# Patient Record
Sex: FEMALE | Race: WHITE | Employment: FULL TIME | URBAN - METROPOLITAN AREA
[De-identification: names, ages, dates, MRNs, and addresses within clinical notes are randomized per-mention and may not be internally consistent; named-entity substitution may affect disease eponyms.]

---

## 2018-03-20 ENCOUNTER — OFFICE VISIT (OUTPATIENT)
Dept: INTERNAL MEDICINE CLINIC | Facility: CLINIC | Age: 33
End: 2018-03-20

## 2018-03-20 VITALS
RESPIRATION RATE: 18 BRPM | TEMPERATURE: 97.9 F | WEIGHT: 193 LBS | SYSTOLIC BLOOD PRESSURE: 142 MMHG | BODY MASS INDEX: 29.25 KG/M2 | HEART RATE: 69 BPM | HEIGHT: 68 IN | DIASTOLIC BLOOD PRESSURE: 88 MMHG

## 2018-03-20 DIAGNOSIS — E66.3 OVERWEIGHT (BMI 25.0-29.9): ICD-10-CM

## 2018-03-20 DIAGNOSIS — N96 HISTORY OF RECURRENT MISCARRIAGES: Primary | ICD-10-CM

## 2018-03-20 DIAGNOSIS — Z86.39 HISTORY OF HYPERCHOLESTEROLEMIA: ICD-10-CM

## 2018-03-20 NOTE — MR AVS SNAPSHOT
[address] [place] [address] 
[phone] Patient: Baldev Pichardo MRN: [id number] :1985 Visit Information Date & Time Provider Department Dept. Phone Encounter #  
 3/20/2018 10:45 AM Naya Reynolds PA-C [facility] Internal Medicine [phone] [id number] Follow-up Instructions Return in about 1 year (around 3/20/2019) for Physical when convenient. Upcoming Health Maintenance Date Due DTaP/Tdap/Td series (1 - Tdap) 10/12/2004 PAP AKA CERVICAL CYTOLOGY 10/12/2004 Influenza Age 5 to Adult 2017 Allergies as of 3/20/2018  Review Complete On: 3/20/2018 By: Naya Reynolds PA-C Severity Noted Reaction Type Reactions Penicillins  2018    Hives Current Immunizations  Never Reviewed No immunizations on file. Not reviewed this visit You Were Diagnosed With   
  
 Codes Comments History of recurrent miscarriages    -  Primary ICD-10-CM: N96 
ICD-9-CM: [id number] History of hypercholesterolemia     ICD-10-CM: Z86.39 
ICD-9-CM: V12.29 Vitals BP Pulse Temp Resp Height(growth percentile) Weight(growth percentile) 131/90 69 97.9 °F (36.6 °C) (Oral) 18 5' 8\" (1.727 m) 193 lb (87.5 kg) LMP BMI OB Status Smoking Status 2018 (Exact Date) 29.35 kg/m2 Having regular periods Never Smoker Vitals History BMI and BSA Data Body Mass Index Body Surface Area  
 29.35 kg/m 2 2.05 m 2 Preferred Pharmacy Pharmacy Name Phone [id number]- [place] [facility], [address] [phone] Your Updated Medication List  
  
Notice  As of 3/20/2018 11:22 AM  
 You have not been prescribed any medications. We Performed the Following CBC WITH AUTOMATED DIFF [82529 CPT(R)] HEMOGLOBIN A1C W/O EAG [44225 CPT(R)] LIPID PANEL [44829 CPT(R)] METABOLIC PANEL, COMPREHENSIVE [02032 CPT(R)] TSH RFX ON ABNORMAL TO FREE T4 [[id number] Custom] Follow-up Instructions Return in about 1 year (around 3/20/2019) for Physical when convenient. Introducing [facility]! Benjie Man introduces [email or web address] patient portal. Now you can access parts of your medical record, email your doctor's office, and request medication refills online. 1. In your internet browser, go to [email or web address] 2. Click on the First Time User? Click Here link in the Sign In box. You will see the New Member Sign Up page. 3. Enter your Explorer.io Access Code exactly as it appears below. You will not need to use this code after youve completed the sign-up process. If you do not sign up before the expiration date, you must request a new code. · Explorer.io Access Code: [email or web address] Expires: 6/18/2018 10:27 AM 
 
4. Enter the last four digits of your Social Security Number (xxxx) and Date of Birth (mm/dd/yyyy) as indicated and click Submit. You will be taken to the next sign-up page. 5. Create a Explorer.io ID. This will be your Explorer.io login ID and cannot be changed, so think of one that is secure and easy to remember. 6. Create a Explorer.io password. You can change your password at any time. 7. Enter your Password Reset Question and Answer. This can be used at a later time if you forget your password. 8. Enter your e-mail address. You will receive e-mail notification when new information is available in [address] 9. Click Sign Up. You can now view and download portions of your medical record. 10. Click the Download Summary menu link to download a portable copy of your medical information. If you have questions, please visit the Frequently Asked Questions section of the Explorer.io website. Remember, Explorer.io is NOT to be used for urgent needs. For medical emergencies, dial 911. Now available from your iPhone and Android! Please provide this summary of care documentation to your next provider. Your primary care clinician is listed as Lalo Recio. If you have any questions after today's visit, please call [phone].

## 2018-03-20 NOTE — PROGRESS NOTES
Chief Complaint   Patient presents with   [facility].

## 2018-03-20 NOTE — PROGRESS NOTES
HISTORY OF PRESENT ILLNESS  Vaibhav Kwan is a 27 y.o. female. Chief Complaint   Patient presents with   [facility]  1) Elevated BP - new dx. Pt notes she has never had high blood pressure readings, but she was at a work party with salty food last night. Uncontrolled at first check:   BP Readings from Last 1 Encounters:   03/20/18 131/90     Remains uncontrolled at recheck:  BP Readings from Last 3 Encounters:   03/20/18 142/88       2) Seeing Dr. Che Beach - ttc   Has a 2.4 yo son. Conceives easily, but has had recurrent miscarriages. Has had 2 miscarriages in the past few months. Emotionally feeling supported and handling this well. Declines counseling/medication at this time. 3) Overweight and hx hyperchol - doing weight watchers to lose weight - lean protein and vegetables. Exercising 10,000 steps/day. Cardio/weights 3 days/week. Review of Systems   Constitutional: Negative for weight loss. Respiratory: Negative for shortness of breath. Cardiovascular: Negative for chest pain and palpitations. Neurological: Negative for dizziness, loss of consciousness and weakness. Psychiatric/Behavioral: Negative for depression and substance abuse. The patient is not nervous/anxious and does not have insomnia. Physical Exam   Constitutional: She is oriented to person, place, and time. She appears well-developed and well-nourished. No distress. HENT:   Head: Normocephalic and atraumatic. Neck: Neck supple. No JVD present. Cardiovascular: Normal rate, regular rhythm and normal heart sounds. Pulmonary/Chest: Effort normal and breath sounds normal. No respiratory distress. Musculoskeletal: She exhibits no edema. Neurological: She is alert and oriented to person, place, and time. Skin: Skin is warm and dry. Psychiatric: She has a normal mood and affect.  Her behavior is normal. Judgment and thought content normal.   Nursing note and vitals reviewed. ASSESSMENT and PLAN    ICD-10-CM ICD-9-CM    1. History of recurrent miscarriages N96 [id number] CBC WITH AUTOMATED DIFF      TSH RFX ON ABNORMAL TO FREE T4   2. History of hypercholesterolemia G17.54 U77.33 METABOLIC PANEL, COMPREHENSIVE      HEMOGLOBIN A1C W/O EAG      LIPID PANEL   3. Overweight (BMI 25.0-29. 9) E66.3 278.02 Encouraged pt's excellent work on this.

## 2018-03-21 PROBLEM — E78.00 HYPERCHOLESTEREMIA: Status: ACTIVE | Noted: 2018-03-21

## 2018-03-21 PROBLEM — E66.3 OVERWEIGHT (BMI 25.0-29.9): Status: ACTIVE | Noted: 2018-03-21

## 2018-03-21 LAB
ALBUMIN SERPL-MCNC: 4.7 G/DL (ref 3.5–5.5)
ALBUMIN/GLOB SERPL: 1.9 {RATIO} (ref 1.2–2.2)
ALP SERPL-CCNC: 36 IU/L (ref 39–117)
ALT SERPL-CCNC: 24 IU/L (ref 0–32)
AST SERPL-CCNC: 18 IU/L (ref 0–40)
BASOPHILS # BLD AUTO: 0 X10E3/UL (ref 0–0.2)
BASOPHILS NFR BLD AUTO: 0 %
BILIRUB SERPL-MCNC: 0.3 MG/DL (ref 0–1.2)
BUN SERPL-MCNC: 13 MG/DL (ref 6–20)
BUN/CREAT SERPL: 15 (ref 9–23)
CALCIUM SERPL-MCNC: 9.4 MG/DL (ref 8.7–10.2)
CHLORIDE SERPL-SCNC: 98 MMOL/L (ref 96–106)
CHOLEST SERPL-MCNC: 240 MG/DL (ref 100–199)
CO2 SERPL-SCNC: 26 MMOL/L (ref 18–29)
CREAT SERPL-MCNC: 0.88 MG/DL (ref 0.57–1)
EOSINOPHIL # BLD AUTO: 0.1 X10E3/UL (ref 0–0.4)
EOSINOPHIL NFR BLD AUTO: 1 %
ERYTHROCYTE [DISTWIDTH] IN BLOOD BY AUTOMATED COUNT: 13.4 % (ref 12.3–15.4)
GFR SERPLBLD CREATININE-BSD FMLA CKD-EPI: 86 ML/MIN/1.73
GFR SERPLBLD CREATININE-BSD FMLA CKD-EPI: 99 ML/MIN/1.73
GLOBULIN SER CALC-MCNC: 2.5 G/DL (ref 1.5–4.5)
GLUCOSE SERPL-MCNC: 93 MG/DL (ref 65–99)
HBA1C MFR BLD: 5.2 % (ref 4.8–5.6)
HCT VFR BLD AUTO: 43.2 % (ref 34–46.6)
HDLC SERPL-MCNC: 82 MG/DL
HGB BLD-MCNC: 14 G/DL (ref 11.1–15.9)
IMM GRANULOCYTES # BLD: 0 X10E3/UL (ref 0–0.1)
IMM GRANULOCYTES NFR BLD: 0 %
LDLC SERPL CALC-MCNC: 122 MG/DL (ref 0–99)
LYMPHOCYTES # BLD AUTO: 2.2 X10E3/UL (ref 0.7–3.1)
LYMPHOCYTES NFR BLD AUTO: 29 %
MCH RBC QN AUTO: 28.5 PG (ref 26.6–33)
MCHC RBC AUTO-ENTMCNC: 32.4 G/DL (ref 31.5–35.7)
MCV RBC AUTO: 88 FL (ref 79–97)
MONOCYTES # BLD AUTO: 0.4 X10E3/UL (ref 0.1–0.9)
MONOCYTES NFR BLD AUTO: 5 %
NEUTROPHILS # BLD AUTO: 4.9 X10E3/UL (ref 1.4–7)
NEUTROPHILS NFR BLD AUTO: 65 %
PLATELET # BLD AUTO: 240 X10E3/UL (ref 150–379)
POTASSIUM SERPL-SCNC: 4.1 MMOL/L (ref 3.5–5.2)
PROT SERPL-MCNC: 7.2 G/DL (ref 6–8.5)
RBC # BLD AUTO: 4.91 X10E6/UL (ref 3.77–5.28)
SODIUM SERPL-SCNC: 139 MMOL/L (ref 134–144)
TRIGL SERPL-MCNC: 180 MG/DL (ref 0–149)
TSH SERPL DL<=0.005 MIU/L-ACNC: 1.46 UIU/ML (ref 0.45–4.5)
VLDLC SERPL CALC-MCNC: 36 MG/DL (ref 5–40)
WBC # BLD AUTO: 7.6 X10E3/UL (ref 3.4–10.8)

## 2018-12-19 LAB
ANTIBODY SCREEN, EXTERNAL: NEGATIVE
CHLAMYDIA, EXTERNAL: NEGATIVE
HBSAG, EXTERNAL: NEGATIVE
HIV, EXTERNAL: NON REACTIVE
N. GONORRHEA, EXTERNAL: NEGATIVE
RUBELLA, EXTERNAL: NORMAL
T. PALLIDUM, EXTERNAL: NEGATIVE
TYPE, ABO & RH, EXTERNAL: NORMAL

## 2019-01-29 ENCOUNTER — HOSPITAL ENCOUNTER (OUTPATIENT)
Dept: ULTRASOUND IMAGING | Age: 34
Discharge: HOME | End: 2019-01-29
Attending: OBSTETRICS & GYNECOLOGY
Payer: COMMERCIAL

## 2019-01-29 DIAGNOSIS — Q51.3 BICORNUATE UTERUS: ICD-10-CM

## 2019-01-29 PROCEDURE — 76770 US EXAM ABDO BACK WALL COMP: CPT

## 2019-06-11 LAB — GRBS, EXTERNAL: NEGATIVE

## 2019-06-29 ENCOUNTER — ANESTHESIA (OUTPATIENT)
Dept: LABOR AND DELIVERY | Age: 34
End: 2019-06-29
Payer: COMMERCIAL

## 2019-06-29 ENCOUNTER — ANESTHESIA EVENT (OUTPATIENT)
Dept: LABOR AND DELIVERY | Age: 34
End: 2019-06-29
Payer: COMMERCIAL

## 2019-06-29 ENCOUNTER — HOSPITAL ENCOUNTER (INPATIENT)
Age: 34
LOS: 2 days | Discharge: HOME | End: 2019-07-01
Attending: OBSTETRICS & GYNECOLOGY | Admitting: ADVANCED PRACTICE MIDWIFE
Payer: COMMERCIAL

## 2019-06-29 PROBLEM — Z34.90 PREGNANCY: Status: ACTIVE | Noted: 2019-06-29

## 2019-06-29 LAB
BASOPHILS # BLD: 0 K/UL (ref 0–0.1)
BASOPHILS NFR BLD: 0 % (ref 0–1)
DIFFERENTIAL METHOD BLD: ABNORMAL
EOSINOPHIL # BLD: 0.1 K/UL (ref 0–0.4)
EOSINOPHIL NFR BLD: 1 % (ref 0–7)
ERYTHROCYTE [DISTWIDTH] IN BLOOD BY AUTOMATED COUNT: 14.2 % (ref 11.5–14.5)
HCT VFR BLD AUTO: 38.4 % (ref 35–47)
HGB BLD-MCNC: 12.3 G/DL (ref 11.5–16)
IMM GRANULOCYTES # BLD AUTO: 0.1 K/UL (ref 0–0.04)
IMM GRANULOCYTES NFR BLD AUTO: 1 % (ref 0–0.5)
LYMPHOCYTES # BLD: 2 K/UL (ref 0.8–3.5)
LYMPHOCYTES NFR BLD: 21 % (ref 12–49)
MCH RBC QN AUTO: 27.5 PG (ref 26–34)
MCHC RBC AUTO-ENTMCNC: 32 G/DL (ref 30–36.5)
MCV RBC AUTO: 85.7 FL (ref 80–99)
MONOCYTES # BLD: 0.5 K/UL (ref 0–1)
MONOCYTES NFR BLD: 5 % (ref 5–13)
NEUTS SEG # BLD: 6.9 K/UL (ref 1.8–8)
NEUTS SEG NFR BLD: 72 % (ref 32–75)
NRBC # BLD: 0 K/UL (ref 0–0.01)
NRBC BLD-RTO: 0 PER 100 WBC
PLATELET # BLD AUTO: 168 K/UL (ref 150–400)
PMV BLD AUTO: 11.4 FL (ref 8.9–12.9)
RBC # BLD AUTO: 4.48 M/UL (ref 3.8–5.2)
WBC # BLD AUTO: 9.5 K/UL (ref 3.6–11)

## 2019-06-29 PROCEDURE — 75410000000 HC DELIVERY VAGINAL/SINGLE

## 2019-06-29 PROCEDURE — 99284 EMERGENCY DEPT VISIT MOD MDM: CPT

## 2019-06-29 PROCEDURE — 74011250637 HC RX REV CODE- 250/637: Performed by: ADVANCED PRACTICE MIDWIFE

## 2019-06-29 PROCEDURE — 3E0R3BZ INTRODUCTION OF ANESTHETIC AGENT INTO SPINAL CANAL, PERCUTANEOUS APPROACH: ICD-10-PCS | Performed by: ADVANCED PRACTICE MIDWIFE

## 2019-06-29 PROCEDURE — 76060000078 HC EPIDURAL ANESTHESIA

## 2019-06-29 PROCEDURE — 0KQM0ZZ REPAIR PERINEUM MUSCLE, OPEN APPROACH: ICD-10-PCS | Performed by: ADVANCED PRACTICE MIDWIFE

## 2019-06-29 PROCEDURE — 74011250636 HC RX REV CODE- 250/636: Performed by: ADVANCED PRACTICE MIDWIFE

## 2019-06-29 PROCEDURE — 75410000002 HC LABOR FEE PER 1 HR

## 2019-06-29 PROCEDURE — 74011000250 HC RX REV CODE- 250: Performed by: ANESTHESIOLOGY

## 2019-06-29 PROCEDURE — 75410000003 HC RECOV DEL/VAG/CSECN EA 0.5 HR

## 2019-06-29 PROCEDURE — A4300 CATH IMPL VASC ACCESS PORTAL: HCPCS

## 2019-06-29 PROCEDURE — 77030011943

## 2019-06-29 PROCEDURE — 85025 COMPLETE CBC W/AUTO DIFF WBC: CPT

## 2019-06-29 PROCEDURE — 36415 COLL VENOUS BLD VENIPUNCTURE: CPT

## 2019-06-29 PROCEDURE — 65410000002 HC RM PRIVATE OB

## 2019-06-29 PROCEDURE — 74011250636 HC RX REV CODE- 250/636: Performed by: ANESTHESIOLOGY

## 2019-06-29 RX ORDER — SODIUM CHLORIDE 0.9 % (FLUSH) 0.9 %
5-40 SYRINGE (ML) INJECTION EVERY 8 HOURS
Status: DISCONTINUED | OUTPATIENT
Start: 2019-06-30 | End: 2019-07-01 | Stop reason: HOSPADM

## 2019-06-29 RX ORDER — ACETAMINOPHEN 325 MG/1
650 TABLET ORAL
Status: DISCONTINUED | OUTPATIENT
Start: 2019-06-29 | End: 2019-07-01 | Stop reason: HOSPADM

## 2019-06-29 RX ORDER — SODIUM CHLORIDE 0.9 % (FLUSH) 0.9 %
5-40 SYRINGE (ML) INJECTION EVERY 8 HOURS
Status: DISCONTINUED | OUTPATIENT
Start: 2019-06-29 | End: 2019-06-29

## 2019-06-29 RX ORDER — FENTANYL/BUPIVACAINE/NS/PF 2-1250MCG
1-16 PREFILLED PUMP RESERVOIR EPIDURAL CONTINUOUS
Status: DISCONTINUED | OUTPATIENT
Start: 2019-06-29 | End: 2019-06-29

## 2019-06-29 RX ORDER — FENTANYL CITRATE 50 UG/ML
INJECTION, SOLUTION INTRAMUSCULAR; INTRAVENOUS AS NEEDED
Status: DISCONTINUED | OUTPATIENT
Start: 2019-06-29 | End: 2019-06-29 | Stop reason: HOSPADM

## 2019-06-29 RX ORDER — SODIUM CHLORIDE 0.9 % (FLUSH) 0.9 %
5-40 SYRINGE (ML) INJECTION AS NEEDED
Status: DISCONTINUED | OUTPATIENT
Start: 2019-06-29 | End: 2019-07-01 | Stop reason: HOSPADM

## 2019-06-29 RX ORDER — IBUPROFEN 400 MG/1
800 TABLET ORAL EVERY 8 HOURS
Status: DISCONTINUED | OUTPATIENT
Start: 2019-06-30 | End: 2019-07-01 | Stop reason: HOSPADM

## 2019-06-29 RX ORDER — EPHEDRINE SULFATE/0.9% NACL/PF 50 MG/5 ML
12.5 SYRINGE (ML) INTRAVENOUS
Status: DISCONTINUED | OUTPATIENT
Start: 2019-06-29 | End: 2019-06-29

## 2019-06-29 RX ORDER — NALOXONE HYDROCHLORIDE 0.4 MG/ML
0.4 INJECTION, SOLUTION INTRAMUSCULAR; INTRAVENOUS; SUBCUTANEOUS AS NEEDED
Status: DISCONTINUED | OUTPATIENT
Start: 2019-06-29 | End: 2019-06-29

## 2019-06-29 RX ORDER — BUPIVACAINE HYDROCHLORIDE 5 MG/ML
INJECTION, SOLUTION EPIDURAL; INTRACAUDAL
Status: COMPLETED | OUTPATIENT
Start: 2019-06-29 | End: 2019-06-29

## 2019-06-29 RX ORDER — ZOLPIDEM TARTRATE 5 MG/1
5 TABLET ORAL
Status: DISCONTINUED | OUTPATIENT
Start: 2019-06-29 | End: 2019-07-01 | Stop reason: HOSPADM

## 2019-06-29 RX ORDER — SWAB
1 SWAB, NON-MEDICATED MISCELLANEOUS DAILY
Status: DISCONTINUED | OUTPATIENT
Start: 2019-06-30 | End: 2019-07-01 | Stop reason: HOSPADM

## 2019-06-29 RX ORDER — HYDROCORTISONE ACETATE PRAMOXINE HCL 2.5; 1 G/100G; G/100G
CREAM TOPICAL AS NEEDED
Status: DISCONTINUED | OUTPATIENT
Start: 2019-06-29 | End: 2019-07-01 | Stop reason: HOSPADM

## 2019-06-29 RX ORDER — HYDROCORTISONE 1 %
CREAM (GRAM) TOPICAL AS NEEDED
Status: DISCONTINUED | OUTPATIENT
Start: 2019-06-29 | End: 2019-07-01 | Stop reason: HOSPADM

## 2019-06-29 RX ORDER — SIMETHICONE 80 MG
80 TABLET,CHEWABLE ORAL
Status: DISCONTINUED | OUTPATIENT
Start: 2019-06-29 | End: 2019-07-01 | Stop reason: HOSPADM

## 2019-06-29 RX ORDER — OXYTOCIN/0.9 % SODIUM CHLORIDE 30/500 ML
0-25 PLASTIC BAG, INJECTION (ML) INTRAVENOUS
Status: DISCONTINUED | OUTPATIENT
Start: 2019-06-29 | End: 2019-06-29

## 2019-06-29 RX ORDER — FENTANYL CITRATE 50 UG/ML
100 INJECTION, SOLUTION INTRAMUSCULAR; INTRAVENOUS ONCE
Status: COMPLETED | OUTPATIENT
Start: 2019-06-29 | End: 2019-06-29

## 2019-06-29 RX ORDER — AMMONIA 15 % (W/V)
1 AMPUL (EA) INHALATION AS NEEDED
Status: DISCONTINUED | OUTPATIENT
Start: 2019-06-29 | End: 2019-07-01 | Stop reason: HOSPADM

## 2019-06-29 RX ORDER — MAG HYDROX/ALUMINUM HYD/SIMETH 200-200-20
30 SUSPENSION, ORAL (FINAL DOSE FORM) ORAL
Status: DISCONTINUED | OUTPATIENT
Start: 2019-06-29 | End: 2019-06-29

## 2019-06-29 RX ORDER — OXYTOCIN/RINGER'S LACTATE 20/1000 ML
999 PLASTIC BAG, INJECTION (ML) INTRAVENOUS AS NEEDED
Status: DISCONTINUED | OUTPATIENT
Start: 2019-06-29 | End: 2019-07-01 | Stop reason: HOSPADM

## 2019-06-29 RX ORDER — ONDANSETRON 4 MG/1
4 TABLET, ORALLY DISINTEGRATING ORAL
Status: DISCONTINUED | OUTPATIENT
Start: 2019-06-29 | End: 2019-07-01 | Stop reason: HOSPADM

## 2019-06-29 RX ORDER — ASPIRIN 81 MG/1
81 TABLET ORAL DAILY
COMMUNITY
End: 2019-07-01

## 2019-06-29 RX ORDER — SODIUM CHLORIDE, SODIUM LACTATE, POTASSIUM CHLORIDE, CALCIUM CHLORIDE 600; 310; 30; 20 MG/100ML; MG/100ML; MG/100ML; MG/100ML
125 INJECTION, SOLUTION INTRAVENOUS CONTINUOUS
Status: DISCONTINUED | OUTPATIENT
Start: 2019-06-29 | End: 2019-06-29

## 2019-06-29 RX ORDER — NALOXONE HYDROCHLORIDE 0.4 MG/ML
0.4 INJECTION, SOLUTION INTRAMUSCULAR; INTRAVENOUS; SUBCUTANEOUS AS NEEDED
Status: DISCONTINUED | OUTPATIENT
Start: 2019-06-29 | End: 2019-06-29 | Stop reason: SDUPTHER

## 2019-06-29 RX ORDER — BUPIVACAINE HYDROCHLORIDE 5 MG/ML
30 INJECTION, SOLUTION EPIDURAL; INTRACAUDAL AS NEEDED
Status: DISCONTINUED | OUTPATIENT
Start: 2019-06-29 | End: 2019-06-29

## 2019-06-29 RX ORDER — DOCUSATE SODIUM 100 MG/1
100 CAPSULE, LIQUID FILLED ORAL
Status: DISCONTINUED | OUTPATIENT
Start: 2019-06-29 | End: 2019-07-01 | Stop reason: HOSPADM

## 2019-06-29 RX ORDER — SODIUM CHLORIDE 0.9 % (FLUSH) 0.9 %
5-40 SYRINGE (ML) INJECTION AS NEEDED
Status: DISCONTINUED | OUTPATIENT
Start: 2019-06-29 | End: 2019-06-29

## 2019-06-29 RX ADMIN — OXYTOCIN-SODIUM CHLORIDE 0.9% IV SOLN 30 UNIT/500ML 2 MILLI-UNITS/MIN: 30-0.9/5 SOLUTION at 17:15

## 2019-06-29 RX ADMIN — IBUPROFEN 800 MG: 400 TABLET ORAL at 23:32

## 2019-06-29 RX ADMIN — BUPIVACAINE HYDROCHLORIDE 5 MG: 5 INJECTION, SOLUTION EPIDURAL; INTRACAUDAL at 16:09

## 2019-06-29 RX ADMIN — FENTANYL CITRATE 100 MCG: 50 INJECTION, SOLUTION INTRAMUSCULAR; INTRAVENOUS at 16:05

## 2019-06-29 RX ADMIN — FENTANYL CITRATE 100 MCG: 50 INJECTION, SOLUTION INTRAMUSCULAR; INTRAVENOUS at 16:09

## 2019-06-29 RX ADMIN — SODIUM CHLORIDE, SODIUM LACTATE, POTASSIUM CHLORIDE, AND CALCIUM CHLORIDE 999 ML/HR: 600; 310; 30; 20 INJECTION, SOLUTION INTRAVENOUS at 15:32

## 2019-06-29 RX ADMIN — SODIUM CHLORIDE, SODIUM LACTATE, POTASSIUM CHLORIDE, AND CALCIUM CHLORIDE 125 ML/HR: 600; 310; 30; 20 INJECTION, SOLUTION INTRAVENOUS at 17:45

## 2019-06-29 RX ADMIN — Medication 10 ML/HR: at 16:10

## 2019-06-29 RX ADMIN — Medication 10 ML: at 15:32

## 2019-06-29 NOTE — PROGRESS NOTES
MARY Labor Progress Note     Patient: Siria Gerardo MRN: [id number]  SSN: [id number]    YOB: 1985  Age: 26 y.o. Sex: female        Subjective:   Patient coping well with contractions. Is now comfortable with epidural.       Objective:   Blood pressure 101/55, pulse 87, temperature (P) 98.3 °F (36.8 °C), resp. rate 12, height 5' 8\" (1.727 m), weight 98.4 kg (217 lb). Fetal heart baseline 135, moderate variability, positive accelerations, negative decelerations, Uterine contractions q 5-7 minutes, moderate to palpation, resting tone soft, Sterile Vaginal Exam: 3 cm dilated/ 60 % effaced/ -2 station, cervix anterior, fetal presentation vertex, membranes ruptured for continued clear fluid. Patient Vitals for the past 4 hrs:   Temp Pulse BP   19 1615  87 101/55   19 1610  86 97/55   19 1605  75 106/66   19 1437 98.3 °F (36.8 °C)           Assessment:     38w4d  Category 1 fetal heart rate tracing   SROM x 8.5 hours      Plan:   Discussed lack of cervical change and option to proceed with pitocin for augmentation. Client agreeable. Pitocin ordered, titrate to adequacy.    Recheck cervix with maternal or fetal indication  Anticipate     Gurmeet Cherry CNM

## 2019-06-29 NOTE — PROGRESS NOTES
1142-report from [address] [place] [address], pt here at this time with grossly ROM, clear fluid  1145-L Alberg here to see pt  1153-SVE 2  1209-off monitor to walk, pt understands importance of positioning and movement  1600-bedside report to [address] [place] [address]

## 2019-06-29 NOTE — PROGRESS NOTES
1545 Bedside shift change report given to [address] (oncoming nurse) by Abilio Hansen (offgoing nurse). Report included the following information SBAR, Kardex, Procedure Summary, Intake/Output, MAR, Accordion, Recent Results and Med Rec Status.    1600 Dr. Dominga Bazan in room for epidural

## 2019-06-29 NOTE — H&P
History and Physical    Patient: Thanh Santos MRN: [id number]  SSN: [id number]    YOB: 1985  Age: 26 y.o. Sex: female      Subjective:      Thanh Santos is a 26 y.o. female [id number] at 38w3d with an JASON of 7/9/19. She presents to labor and delivery for SROM at 0800 this am. Reports she was eating breakfast and felt very wet, stood up and had a large gush of fluid. Has continued to leak since. Reports clear fluid and denies bleeding. States she is having some contractions, about every 4 minutes, but mild in nature, would not have yet come into l and d for just the contractions if not ruptured. Endorses good fetal movememnt. Client has had 4 previous miscarriages, SABs with T16, T15 and T11. Has had D/c x5. Genetic screening negative with this pregnancy. Client with history of bicornuate uterus and has had a septum resection, growth scans WNL. Client also with breast lump that grew with pregnancy- benign biopsy. MTHFR deficiency. Client also AMA. Despite history pregnancy has been uneventful. Past Medical History:   Diagnosis Date    Breast disorder      lactating fibroadinoma, benign     Past Surgical History:   Procedure Laterality Date    HX DILATION AND CURETTAGE      HX FASCIOTOMY      HX OTHER SURGICAL      3 d&cs    HX TONSILLECTOMY      HX WISDOM TEETH EXTRACTION        Family History   Problem Relation Age of Onset    Thyroid Cancer Sister      Social History     Tobacco Use    Smoking status: Never Smoker    Smokeless tobacco: Never Used   Substance Use Topics    Alcohol use: Not Currently     Comment: Weekends      Prior to Admission medications    Medication Sig Start Date End Date Taking? Authorizing Provider   aspirin delayed-release 81 mg tablet Take 81 mg by mouth daily. Yes Samantha Urena MD   mecobal/levomefolat Ca/B6 phos (METANX PO) Take 1 Tab by mouth daily.  Indications: pregnancy   Yes Samantha Urena MD   prenatal vit calc,iron,folic (PRENATAL VITAMIN PO) Take 1 Tab by mouth daily. Indications: pregnancy   Yes Daksha Portillo MD        Allergies   Allergen Reactions    Penicillins Hives       Review of Systems:  A comprehensive review of systems was negative except for that written in the History of Present Illness. Objective:     Vitals:    06/29/19 1125   Weight: 98.4 kg (217 lb)   Height: 5' 8\" (1.727 m)        Physical Exam:  GENERAL: alert, cooperative, no distress, appears stated age  LUNG: clear to auscultation bilaterally  HEART: regular rate and rhythm, S1, S2 normal, no murmur, click, rub or gallop  ABDOMEN: soft, non-tender. Bowel sounds normal. No masses,  no organomegaly, gravid  EXTREMITIES:  extremities normal, atraumatic, no cyanosis or edema  SKIN: Normal.  NEUROLOGIC: negative  PSYCHIATRIC: WNL    SVE: 2-3/40/-2, grossly ruptured, vertex    NST: Monitored for 20 minutes, reactive, cat 1, baseline: 140, positive accels, no decels, moderate variability, ctx q 4-5 minutes, mild to palpation. Assessment:     Hospital Problems  Date Reviewed: 3/21/2018          Codes Class Noted POA    Pregnancy ICD-10-CM: Z34.90  ICD-9-CM: V22.2  6/29/2019 Unknown            SROM x 4 hours  Early labor  A Positive  Rubella Immune  Hep B and HIV Negative  GBS Negative  Hx of habitual SABs  Hx of bicornuate uterus s/p septum resection  MTHFR Deficiency  AMA    Plan:     Admit for SROM and early labor. IV, CBC and sample to blood bank. Discussed in detail expectant management for 12 hours verses pitocin augmentation at this time. Client desires expectant management for 12 hours post rupture and then pitocin if not in labor. Encouraged ambulation and position changes to encourage labor. Maternal and fetal monitoring per protocol. Epidural at client request for pain control. Dr. Marnie Hernandes aware of patient arrival and history. Anticipate vaginal delivery.      Signed By: Mary Willett CNM     June 29, 2019 ATTENDING: I have reviewed the documentation, discussed patient with Selene Shaw, and agree with assessment and plan as outlined.   Signed By: Yannick Moya MD     June 29, 2019

## 2019-06-29 NOTE — PROGRESS NOTES
Bedside and Verbal shift change report given to Hadley Lopez RN (oncoming nurse) by Sandie Tai RN (offgoing nurse). Report included the following information SBAR, Kardex, Intake/Output, MAR, Accordion and Recent Results. 9009-5722: Knee chest  8368-2359:  Manuela  2012:  CHIARA Frankel CNM at bedside. SVE 10/100/+3  :  Started pushing  :  female infant  0519-1319:  Pulse ox placed on infant's right wrist , O2 sat %  :  Pt assisted to restroom, unable to void  [address] edu completed. :  TRANSFER - OUT REPORT:    Verbal report given to STACEY Rock RN(name) on Cee Mini  being transferred to MIU(unit) for routine progression of care       Report consisted of patients Situation, Background, Assessment and   Recommendations(SBAR). Information from the following report(s) SBAR, Intake/Output, MAR and Recent Results was reviewed with the receiving nurse. Lines:   Peripheral IV 19 Anterior; Left Forearm (Active)        Opportunity for questions and clarification was provided.       Patient transported with:   Registered Nurse

## 2019-06-29 NOTE — ANESTHESIA PROCEDURE NOTES
Epidural Block    Start time: 6/29/2019 4:00 PM  End time: 6/29/2019 4:09 PM  Performed by: Dawson Gonzalez MD  Authorized by: Dawson Gonzalez MD     Pre-Procedure  Indication: labor epidural    Preanesthetic Checklist: risks and benefits discussed and timeout performed    Timeout Time: 16:00        Epidural:   Patient position:  Left lateral decubitus  Prep region:  Lumbar  Prep: Chlorhexidine    Location:  L2-3    Needle and Epidural Catheter:   Needle Type:  Tuohy  Needle Gauge:  17 G  Injection Technique:  Loss of resistance using air  Attempts:  1  Catheter Size:  19 G  Events: no blood with aspiration, no cerebrospinal fluid with aspiration, no paresthesia and negative aspiration test    Test Dose:  Bupivacaine 0.25% and negative    Assessment:   Catheter Secured:  Tegaderm and tape  Insertion:  Uncomplicated  Patient tolerance:  Patient tolerated the procedure well with no immediate complications

## 2019-06-29 NOTE — ANESTHESIA PREPROCEDURE EVALUATION
Relevant Problems   No relevant active problems       Anesthetic History   No history of anesthetic complications            Review of Systems / Medical History  Patient summary reviewed, nursing notes reviewed and pertinent labs reviewed    Pulmonary  Within defined limits                 Neuro/Psych   Within defined limits           Cardiovascular  Within defined limits                     GI/Hepatic/Renal  Within defined limits              Endo/Other  Within defined limits           Other Findings              Physical Exam    Airway  Mallampati: II  TM Distance: > 6 cm  Neck ROM: normal range of motion   Mouth opening: Normal     Cardiovascular  Regular rate and rhythm,  S1 and S2 normal,  no murmur, click, rub, or gallop             Dental  No notable dental hx       Pulmonary  Breath sounds clear to auscultation               Abdominal  GI exam deferred       Other Findings            Anesthetic Plan    ASA: 2  Anesthesia type: epidural            Anesthetic plan and risks discussed with: Patient

## 2019-06-30 PROCEDURE — 74011250637 HC RX REV CODE- 250/637: Performed by: OBSTETRICS & GYNECOLOGY

## 2019-06-30 PROCEDURE — 74011250637 HC RX REV CODE- 250/637: Performed by: ADVANCED PRACTICE MIDWIFE

## 2019-06-30 PROCEDURE — 65410000002 HC RM PRIVATE OB

## 2019-06-30 RX ORDER — OXYCODONE AND ACETAMINOPHEN 5; 325 MG/1; MG/1
1 TABLET ORAL
Status: DISPENSED | OUTPATIENT
Start: 2019-06-30 | End: 2019-07-01

## 2019-06-30 RX ADMIN — IBUPROFEN 800 MG: 400 TABLET ORAL at 16:17

## 2019-06-30 RX ADMIN — HYDROCORTISONE ACETATE PRAMOXINE HCL: 2.5; 1 CREAM TOPICAL at 11:13

## 2019-06-30 RX ADMIN — OXYCODONE HYDROCHLORIDE AND ACETAMINOPHEN 1 TABLET: 5; 325 TABLET ORAL at 09:57

## 2019-06-30 RX ADMIN — IBUPROFEN 800 MG: 400 TABLET ORAL at 07:40

## 2019-06-30 RX ADMIN — OXYCODONE HYDROCHLORIDE AND ACETAMINOPHEN 1 TABLET: 5; 325 TABLET ORAL at 22:27

## 2019-06-30 RX ADMIN — Medication 1 TABLET: at 09:57

## 2019-06-30 RX ADMIN — DOCUSATE SODIUM 100 MG: 100 CAPSULE, LIQUID FILLED ORAL at 09:57

## 2019-06-30 RX ADMIN — OXYCODONE HYDROCHLORIDE AND ACETAMINOPHEN 1 TABLET: 5; 325 TABLET ORAL at 16:27

## 2019-06-30 RX ADMIN — OXYCODONE HYDROCHLORIDE AND ACETAMINOPHEN 1 TABLET: 5; 325 TABLET ORAL at 03:47

## 2019-06-30 NOTE — PROGRESS NOTES
Post-Partum Day Number 1 Progress Note    Shahida Yannick     Assessment: Doing well, post partum day 1    Plan:  1. Continue routine postpartum and perineal care as well as maternal education. 2. N/A     Information for the patient's :  Linda Carnes [[id number]]   Vaginal, Spontaneous   Patient doing well without significant complaint. Voiding without difficulty, normal lochia. Vitals:  Visit Vitals  /73 (BP 1 Location: Right arm, BP Patient Position: Sitting)   Pulse 64   Temp 97.8 °F (36.6 °C)   Resp 16   Ht 5' 8\" (1.727 m)   Wt 98.4 kg (217 lb)   SpO2 97%   Breastfeeding? Unknown   BMI 32.99 kg/m²     Temp (24hrs), Av.4 °F (36.9 °C), Min:97.8 °F (36.6 °C), Max:98.8 °F (37.1 °C)        Exam:   Patient without distress. Abdomen soft, fundus firm, nontender                Lower extremities are symmetric without tenderness, cords or erythema. Labs:     Lab Results   Component Value Date/Time    WBC 9.5 2019 02:36 PM    WBC 7.6 2018 11:21 AM    HGB 12.3 2019 02:36 PM    HGB 14.0 2018 11:21 AM    HCT 38.4 2019 02:36 PM    HCT 43.2 2018 11:21 AM    PLATELET 043  02:36 PM    PLATELET 676  11:21 AM       Recent Results (from the past 24 hour(s))   CBC WITH AUTOMATED DIFF    Collection Time: 19  2:36 PM   Result Value Ref Range    WBC 9.5 3.6 - 11.0 K/uL    RBC 4.48 3.80 - 5.20 M/uL    HGB 12.3 11.5 - 16.0 g/dL    HCT 38.4 35.0 - 47.0 %    MCV 85.7 80.0 - 99.0 FL    MCH 27.5 26.0 - 34.0 PG    MCHC 32.0 30.0 - 36.5 g/dL    RDW 14.2 11.5 - 14.5 %    PLATELET 777 133 - 782 K/uL    MPV 11.4 8.9 - 12.9 FL    NRBC 0.0 0  WBC    ABSOLUTE NRBC 0.00 0.00 - 0.01 K/uL    NEUTROPHILS 72 32 - 75 %    LYMPHOCYTES 21 12 - 49 %    MONOCYTES 5 5 - 13 %    EOSINOPHILS 1 0 - 7 %    BASOPHILS 0 0 - 1 %    IMMATURE GRANULOCYTES 1 (H) 0.0 - 0.5 %    ABS. NEUTROPHILS 6.9 1.8 - 8.0 K/UL    ABS.  LYMPHOCYTES 2.0 0.8 - 3.5 K/UL    ABS. MONOCYTES 0.5 0.0 - 1.0 K/UL    ABS. EOSINOPHILS 0.1 0.0 - 0.4 K/UL    ABS. BASOPHILS 0.0 0.0 - 0.1 K/UL    ABS. IMM.  GRANS. 0.1 (H) 0.00 - 0.04 K/UL    DF AUTOMATED

## 2019-06-30 NOTE — L&D DELIVERY NOTE
CNM Delivery Note       Patient: Viridiana Record MRN: [id number]  SSN: [id number]    YOB: 1985  Age: 26 y.o. Sex: female         of a live female infant at 65 with Apgars 7, 8 in JEFF position under epidural anesthesia with mother in stirrups position. Nuchal x2 easily reduced on perineum. Shoulders spontaneous, easily delivered with maternal effort. Vigorous infant placed on maternal abdomen immediately following delivery. Placenta and membranes manually removed due to feeling avulsing cord, intact, with 3 vessel cord via [place] mechanism at 2030. Marginal velamentous avulsed cord noted. Fundus massaged to firm. Estimated blood loss 300 mL. Vagina and perineum inspected. 2nd degree perineal laceration repaired with 2-0 vicryl under epidural anesthesia. Mother and infant stable, bonding, establishing breastfeeding. Delivery Summary    Patient: Viridiana Record MRN: [id number]  SSN: [id number]    YOB: 1985  Age: 26 y.o. Sex: female       Information for the patient's :  Edwardo Garcia [[id number]]       Labor Events:    Labor: No    Steroids: None   Cervical Ripening Date/Time:       Cervical Ripening Type: None   Antibiotics During Labor:     Rupture Identifier:      Rupture Date/Time: 2019 11:00 AM   Rupture Type: SROM   Amniotic Fluid Volume: Moderate    Amniotic Fluid Description:      Amniotic Fluid Odor: None    Induction: None       Induction Date/Time:        Indications for Induction:      Augmentation: Oxytocin   Augmentation Date/Time: 81099:97 PM   Indications for Augmentation: Ineffective Contraction Pattern   Labor complications:          Additional complications:        Delivery Events:  Indications For Episiotomy:     Episiotomy: None   Perineal Laceration(s): 2nd   Repaired: Yes   Periurethral Laceration Location:      Repaired:     Labial Laceration Location:     Repaired:     Sulcal Laceration Location:     Repaired:     Vaginal Laceration Location:     Repaired:     Cervical Laceration Location:     Repaired:     Repair Suture: Vicryl 2-0   Number of Repair Packets: 1   Estimated Blood Loss (ml): 300ml     Delivery Date: 2019    Delivery Time: 8:25 PM  Delivery Type: Vaginal, Spontaneous  Sex:  Female    Gestational Age: 38w3d   Delivery Clinician:  Xiomy Beard  Living Status: Living   Delivery Location: L&D            APGARS  One minute Five minutes Ten minutes   Skin color: 0   1        Heart rate: 2   2        Grimace: 2   2        Muscle tone: 1   1        Breathin   2        Totals: 7   8            Presentation: Vertex    Position: Left Occiput Anterior  Resuscitation Method:  Suctioning-bulb; Tactile Stimulation     Meconium Stained: None      Cord Information: 3 Vessels  Complications: Nuchal Cord Without Compressions  Cord around: head  Delayed cord clamping? Yes  Cord clamped date/time:   Disposition of Cord Blood: Discard    Blood Gases Sent?: No    Placenta:  Date/Time: 2019  8:30 PM  Removal: Spontaneous      Appearance: Other (comment); Intact      Measurements:  Birth Weight:        Birth Length:        Head Circumference:        Chest Circumference:       Abdominal Girth: Other Providers:   MIGUEL ÁNGEL Vazquez;Shashank PIPER, Midwife;Primary Nurse;Primary [place] Nurse           Group B Strep:   Lab Results   Component Value Date/Time    GrBStrep, External NEGATIVE 2019     Information for the patient's :  Elyce Rash [[id number]]   No results found for: ABORH, PCTABR, PCTDIG, BILI, ABORHEXT, ABORH    No results for input(s): PCO2CB, PO2CB, HCO3I, SO2I, IBD, PTEMPI, SPECTI, PHICB, ISITE, IDEV, IALLEN in the last 72 hours.

## 2019-06-30 NOTE — LACTATION NOTE
This note was copied from a baby's chart. Baby nursing well today,  deep latch obtained, mother is comfortable, baby feeding vigorously with rhythmic suck, swallow, breathe pattern, audible swallowing, and evident milk transfer, both breasts offered, baby is asleep following feeding. Experienced mother has no needs, expresses confidence in ability to breastfeed independently.

## 2019-06-30 NOTE — ROUTINE PROCESS
TRANSFER - IN REPORT: 
 
Verbal report received from KEMI Elizabeth(name) on Todd Vivar  being received from L sam [facility](unit) for routine progression of care Report consisted of patients Situation, Background, Assessment and  
Recommendations(SBAR). Information from the following report(s) SBAR, Procedure Summary, Intake/Output, MAR, Recent Results and Med Rec Status was reviewed with the receiving nurse. Opportunity for questions and clarification was provided. Assessment completed upon patients arrival to unit and care assumed. 0340-Pt requesting percocet for post delivery pain and cramping, states motrin helped some. Offered Tylenol,pt declined stating she believes she will need something stronger. Dr Inez Alicea called and notified of pt status, orders received.

## 2019-06-30 NOTE — ROUTINE PROCESS
Bedside shift change report given to AUGUSTUS Stone (oncoming nurse) by Nitin Clemens RN (offgoing nurse). Report included the following information SBAR.

## 2019-06-30 NOTE — ANESTHESIA POSTPROCEDURE EVALUATION
* No procedures listed *.    epidural    Anesthesia Post Evaluation        Patient location during evaluation: PACU  Patient participation: complete - patient participated  Level of consciousness: awake and alert  Pain management: adequate  Airway patency: patent  Anesthetic complications: no  Cardiovascular status: acceptable  Respiratory status: acceptable  Hydration status: acceptable  Comments: I have seen and evaluated the patient and is ready for discharge.  Mirza Fraser MD    Post anesthesia nausea and vomiting:  none      Vitals Value Taken Time   /71 6/29/2019  9:55 PM   Temp     Pulse 83 6/29/2019  9:55 PM   Resp     SpO2

## 2019-07-01 VITALS
WEIGHT: 217 LBS | BODY MASS INDEX: 32.89 KG/M2 | OXYGEN SATURATION: 97 % | DIASTOLIC BLOOD PRESSURE: 80 MMHG | TEMPERATURE: 98.1 F | RESPIRATION RATE: 16 BRPM | HEIGHT: 68 IN | HEART RATE: 76 BPM | SYSTOLIC BLOOD PRESSURE: 124 MMHG

## 2019-07-01 PROCEDURE — 74011250637 HC RX REV CODE- 250/637: Performed by: OBSTETRICS & GYNECOLOGY

## 2019-07-01 PROCEDURE — 74011250637 HC RX REV CODE- 250/637: Performed by: ADVANCED PRACTICE MIDWIFE

## 2019-07-01 RX ORDER — OXYCODONE AND ACETAMINOPHEN 5; 325 MG/1; MG/1
1 TABLET ORAL
Qty: 15 TAB | Refills: 0 | Status: SHIPPED | OUTPATIENT
Start: 2019-07-01 | End: 2019-07-06

## 2019-07-01 RX ORDER — IBUPROFEN 800 MG/1
800 TABLET ORAL
Qty: 40 TAB | Refills: 1 | Status: SHIPPED | OUTPATIENT
Start: 2019-07-01

## 2019-07-01 RX ORDER — OXYCODONE AND ACETAMINOPHEN 5; 325 MG/1; MG/1
1 TABLET ORAL
Status: DISCONTINUED | OUTPATIENT
Start: 2019-07-01 | End: 2019-07-01 | Stop reason: HOSPADM

## 2019-07-01 RX ADMIN — IBUPROFEN 800 MG: 400 TABLET ORAL at 10:03

## 2019-07-01 RX ADMIN — OXYCODONE HYDROCHLORIDE AND ACETAMINOPHEN 1 TABLET: 5; 325 TABLET ORAL at 05:31

## 2019-07-01 RX ADMIN — IBUPROFEN 800 MG: 400 TABLET ORAL at 01:32

## 2019-07-01 RX ADMIN — Medication 1 TABLET: at 11:20

## 2019-07-01 RX ADMIN — DOCUSATE SODIUM 100 MG: 100 CAPSULE, LIQUID FILLED ORAL at 10:03

## 2019-07-01 RX ADMIN — OXYCODONE HYDROCHLORIDE AND ACETAMINOPHEN 1 TABLET: 5; 325 TABLET ORAL at 10:53

## 2019-07-01 NOTE — PROGRESS NOTES
Bedside and Verbal shift change report given to JARON Tsang RN  (oncoming nurse) by Bandar Chiang (offgoing nurse). Report included the following information SBAR.

## 2019-07-01 NOTE — DISCHARGE SUMMARY
Obstetrical Discharge Summary     Name: Dante Moraes MRN: [id number]  SSN: [id number]    YOB: 1985  Age: 26 y.o. Sex: female      Admit Date: 2019    Discharge Date: 2019     Admitting Physician: Adriana Mcnamara CNM     Attending Physician:  Gary Moreno MD     Admission Diagnoses: Pregnancy [Z34.90]    Discharge Diagnoses:   Information for the patient's :  Deonte Gentile [[id number]]   Delivery of a 3.245 kg female infant via Vaginal, Spontaneous on 2019 at 8:25 PM  by Adriana Mcnamara. Apgars were 7  and 8 . Additional Diagnoses:   Hospital Problems  Date Reviewed: 3/21/2018          Codes Class Noted POA    Pregnancy ICD-10-CM: Z34.90  ICD-9-CM: V22.2  2019 Unknown             Lab Results   Component Value Date/Time    Rubella, External IMMUNE 2018    GrBStrep, External NEGATIVE 2019       Immunization(s):   There is no immunization history on file for this patient. Hospital Course: Normal hospital course following the delivery. Patient Instructions:   Current Discharge Medication List      START taking these medications    Details   ibuprofen (MOTRIN) 800 mg tablet Take 1 Tab by mouth every six (6) hours as needed for Pain. Qty: 40 Tab, Refills: 1      oxyCODONE-acetaminophen (PERCOCET) 5-325 mg per tablet Take 1 Tab by mouth every six (6) hours as needed for Pain for up to 5 days. Max Daily Amount: 4 Tabs. Qty: 15 Tab, Refills: 0    Associated Diagnoses: Obstetrical laceration, second degree         CONTINUE these medications which have NOT CHANGED    Details   mecobal/levomefolat Ca/B6 phos (METANX PO) Take 1 Tab by mouth daily. Indications: pregnancy      prenatal vit calc,iron,folic (PRENATAL VITAMIN PO) Take 1 Tab by mouth daily.  Indications: pregnancy         STOP taking these medications       aspirin delayed-release 81 mg tablet Comments:   Reason for Stopping:               Please make followup appointment for 4-6 weeks  Pelvic rest for 6 weeks  Pain medication as prescribed. Use of contraception as discussed.     Condition: stable  Disposition: home    Follow-up Appointments   Procedures    FOLLOW UP VISIT Appointment in: Theodora Gamez     Standing Status:   Standing     Number of Occurrences:   1     Order Specific Question:   Appointment in     Answer:   6 Weeks        Signed By:  Enedelia Walters MD     July 1, 2019

## 2019-07-01 NOTE — LACTATION NOTE
This note was copied from a baby's chart. Mom and baby scheduled for discharge today. I did not see the baby at the breast. Mom states baby is nursing well and has improved throughout post partum stay, deep latch maintained, mother is comfortable, milk is in transition, baby feeding vigorously with rhythmic suck, swallow, breathe pattern, with audible swallowing, and evident milk transfer, both breasts offered, baby is asleep following feeding. Baby is feeding on demand, voiding and stools present as appropriate over the last 24 hours. We reviewed cluster feeding. Frequent feeding during this brief behavioral phase preceeding milk transition is called cluster feeding. Typical  behavior: baby becomes vigorous at the breast and wants to feed frequently- every 1-2 hours for several feedings. Emptying of the breast twice produces double in subsequent feedings. This is the normal process by which the baby demands his/her supply. This type of frequent feeding is the stimulation which causes lactogenesis II (milk coming in). We discussed engorgement. Breasts may become engorged when milk \"comes in\". How milk is made / normal phases of milk production, supply and demand discussed. Taught care of engorged breasts - frequent breastfeeding encouraged. Mom should put the baby to the breast and allow him to completely finish one breast before offering the second breast. She may pump a couple minutes after nursing for comfort. She can apply ice to the breasts for 10-15 minutes after nursing as needed. Breast feeding teaching completed and all questions answered. Mom has no further questions for lactation before discharge.

## 2019-07-01 NOTE — PROGRESS NOTES
Post-Partum Day Number 2 Progress Note    Allyne All     Assessment: Doing well, post partum day 2    Plan:   1. Discharge home today  2. Follow up in office in 6 weeks with Nikkie Ch MD  3. Post partum activity advised, diet as tolerated  4. Discharge Medications: ibuprofen, percocet and medications prior to admission    Information for the patient's :  Regine Jackson [[id number]]   Vaginal, Spontaneous   Patient doing well without significant complaint. Voiding without difficulty, normal lochia. Vitals:  Visit Vitals  /78   Pulse (!) 56   Temp 98.3 °F (36.8 °C)   Resp 18   Ht 5' 8\" (1.727 m)   Wt 98.4 kg (217 lb)   SpO2 97%   Breastfeeding? Unknown   BMI 32.99 kg/m²     Temp (24hrs), Av °F (36.7 °C), Min:97.7 °F (36.5 °C), Max:98.3 °F (36.8 °C)      Exam:         Patient without distress. Abdomen soft, fundus firm, nontender                 Lower extremities are negative for swelling, cords or tenderness. Labs:     Lab Results   Component Value Date/Time    WBC 9.5 2019 02:36 PM    WBC 7.6 2018 11:21 AM    HGB 12.3 2019 02:36 PM    HGB 14.0 2018 11:21 AM    HCT 38.4 2019 02:36 PM    HCT 43.2 2018 11:21 AM    PLATELET 380  02:36 PM    PLATELET 760  11:21 AM       No results found for this or any previous visit (from the past 24 hour(s)).

## 2019-07-01 NOTE — ROUTINE PROCESS
0800 Bedside and Verbal shift change report given to NICHOLAS Mckenzie (oncoming nurse) by Nuha Garcia. Aniya Johnston RN (offgoing nurse). Report included the following information SBAR.  
 
 
1125  Discharge instructions reviewed with patient, she verbalized understanding. Pt. Discharged to home with infant in stable condition

## 2019-07-01 NOTE — DISCHARGE INSTRUCTIONS
Patient Education        After Your Delivery (the Postpartum Period): Care Instructions  Your Care Instructions    Congratulations on the birth of your baby. Like pregnancy, the  period can be a time of excitement, lindsay, and exhaustion. You may look at your wondrous little baby and feel happy. You may also be overwhelmed by your new sleep hours and new responsibilities. At first, babies often sleep during the days and are awake at night. They do not have a pattern or routine. They may make sudden gasps, jerk themselves awake, or look like they have crossed eyes. These are all normal, and they may even make you smile. In these first weeks after delivery, try to take good care of yourself. It may take 4 to 6 weeks to feel like yourself again, and possibly longer if you had a  birth. You will likely feel very tired for several weeks. Your days will be full of ups and downs, but lots of lindsay as well. Follow-up care is a key part of your treatment and safety. Be sure to make and go to all appointments, and call your doctor if you are having problems. It's also a good idea to know your test results and keep a list of the medicines you take. How can you care for yourself at home? Take care of your body after delivery  · Use pads instead of tampons for the bloody flow that may last as long as 2 weeks. · Ease cramps with ibuprofen (Advil, Motrin). · Ease soreness of hemorrhoids and the area between your vagina and rectum with ice compresses or witch hazel pads. · Ease constipation by drinking lots of fluid and eating high-fiber foods. Ask your doctor about over-the-counter stool softeners. · Cleanse yourself with a gentle squeeze of warm water from a bottle instead of wiping with toilet paper. · Take a sitz bath in warm water several times a day. · Wear a good nursing bra. Ease sore and swollen breasts with warm, wet washcloths.   · If you are not breastfeeding, use ice rather than heat for breast soreness. · Your period may not start for several months if you are breastfeeding. You may bleed more, and longer at first, than you did before you got pregnant. · Wait until you are healed (about 4 to 6 weeks) before you have sexual intercourse. Your doctor will tell you when it is okay to have sex. · Try not to travel with your baby for 5 or 6 weeks. If you take a long car trip, make frequent stops to walk around and stretch. Avoid exhaustion  · Rest every day. Try to nap when your baby naps. · Ask another adult to be with you for a few days after delivery. · Plan for  if you have other children. · Stay flexible so you can eat at odd hours and sleep when you need to. Both you and your baby are making new schedules. · Plan small trips to get out of the house. Change can make you feel less tired. · Ask for help with housework, cooking, and shopping. Remind yourself that your job is to care for your baby. Know about help for postpartum depression  · \"Baby blues\" are common for the first 1 to 2 weeks after birth. You may cry or feel sad or irritable for no reason. · Rest whenever you can. Being tired makes it harder to handle your emotions. · Go for walks with your baby. · Talk to your partner, friends, and family about your feelings. · If your symptoms last for more than a few weeks, or if you feel very depressed, ask your doctor for help. · Postpartum depression can be treated. Support groups and counseling can help. Sometimes medicine can also help. Stay healthy  · Eat healthy foods so you have more energy and lose extra baby pounds. · If you breastfeed, avoid drugs. If you quit smoking during pregnancy, try to stay smoke-free. If you choose to have a drink now and then, have only one drink, and limit the number of occasions that you have a drink. Wait to breastfeed at least 2 hours after you have a drink to reduce the amount of alcohol the baby may get in the milk.   · Start daily exercise after 4 to 6 weeks, but rest when you feel tired. · Learn exercises to tone your belly. Do Kegel exercises to regain strength in your pelvic muscles. You can do these exercises while you stand or sit. ? Squeeze the same muscles you would use to stop your urine. Your belly and thighs should not move. ? Hold the squeeze for 3 seconds, and then relax for 3 seconds. ? Start with 3 seconds. Then add 1 second each week until you are able to squeeze for 10 seconds. ? Repeat the exercise 10 to 15 times for each session. Do three or more sessions each day. · Find a class for new mothers and new babies that has an exercise time. When should you call for help? Call 911 anytime you think you may need emergency care. For example, call if:    · You passed out (lost consciousness).    Call your doctor now or seek immediate medical care if:    · You have severe vaginal bleeding. This means you are passing blood clots and soaking through a pad each hour for 2 or more hours.     · You are dizzy or lightheaded, or you feel like you may faint.     · You have a fever.     · You have new belly pain, or your pain gets worse.    Watch closely for changes in your health, and be sure to contact your doctor if:    · Your vaginal bleeding seems to be getting heavier.     · You have new or worse vaginal discharge.     · You feel sad, anxious, or hopeless for more than a few days.     · You do not get better as expected. These are general instructions for a healthy lifestyle:    No smoking/ No tobacco products/ Avoid exposure to second hand smoke  Surgeon General's Warning:  Quitting smoking now greatly reduces serious risk to your health.     Obesity, smoking, and sedentary lifestyle greatly increases your risk for illness    A healthy diet, regular physical exercise & weight monitoring are important for maintaining a healthy lifestyle    You may be retaining fluid if you have a history of heart failure or if you experience any of the following symptoms:  Weight gain of 3 pounds or more overnight or 5 pounds in a week, increased swelling in our hands or feet or shortness of breath while lying flat in bed. Please call your doctor as soon as you notice any of these symptoms; do not wait until your next office visit. The discharge information has been reviewed with the {PATIENT PARENT GUARDIAN:41714}. The {PATIENT PARENT GUARDIAN:34764} verbalized understanding. Discharge medications reviewed with the {D[employer]car[employer] meds reviewed FRRB:74212} and appropriate educational materials and side effects teaching were provided. ___________________________________________________________________________________________________________________________________    Where can you learn more? Go to http://bigg-anastasiya.info/. Enter A461 in the search box to learn more about \"After Your Delivery (the Postpartum Period): Care Instructions. \"  Current as of: September 5, 2018  Content Version: 11.9  © 3018-9451 [employer]. Care instructions adapted under license by [employer] (which disclaims liability or warranty for this information). If you have questions about a medical condition or this instruction, always ask your healthcare professional. [address] any warranty or liability for your use of this information.

## 2022-03-18 PROBLEM — Z34.90 PREGNANCY: Status: ACTIVE | Noted: 2019-06-29

## 2022-03-19 PROBLEM — E66.3 OVERWEIGHT (BMI 25.0-29.9): Status: ACTIVE | Noted: 2018-03-21

## 2022-03-19 PROBLEM — N96 HISTORY OF RECURRENT MISCARRIAGES: Status: ACTIVE | Noted: 2018-03-20

## 2022-03-19 PROBLEM — E78.00 HYPERCHOLESTEREMIA: Status: ACTIVE | Noted: 2018-03-21

## 2023-05-21 RX ORDER — IBUPROFEN 800 MG/1
800 TABLET ORAL EVERY 6 HOURS PRN
COMMUNITY
Start: 2019-07-01

## 2023-11-08 ENCOUNTER — HOSPITAL ENCOUNTER (OUTPATIENT)
Facility: HOSPITAL | Age: 38
Discharge: HOME | End: 2023-11-11
Attending: OBSTETRICS & GYNECOLOGY
Payer: COMMERCIAL

## 2023-11-08 DIAGNOSIS — N64.4 MASTODYNIA: ICD-10-CM

## 2023-11-08 PROCEDURE — 77066 DX MAMMO INCL CAD BI: CPT

## 2025-07-07 ENCOUNTER — TRANSCRIBE ORDERS (OUTPATIENT)
Facility: HOSPITAL | Age: 40
End: 2025-07-07

## 2025-07-07 DIAGNOSIS — Z91.89 OTHER SPECIFIED PERSONAL RISK FACTORS, NOT ELSEWHERE CLASSIFIED: Primary | ICD-10-CM
